# Patient Record
Sex: MALE | Race: WHITE
[De-identification: names, ages, dates, MRNs, and addresses within clinical notes are randomized per-mention and may not be internally consistent; named-entity substitution may affect disease eponyms.]

---
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
## 2019-06-03 PROBLEM — Z00.00 ENCOUNTER FOR PREVENTIVE HEALTH EXAMINATION: Status: ACTIVE | Noted: 2019-06-03

## 2019-06-06 ENCOUNTER — APPOINTMENT (OUTPATIENT)
Dept: ORTHOPEDIC SURGERY | Facility: CLINIC | Age: 19
End: 2019-06-06

## 2019-06-18 ENCOUNTER — APPOINTMENT (OUTPATIENT)
Dept: ORTHOPEDIC SURGERY | Facility: CLINIC | Age: 19
End: 2019-06-18
Payer: COMMERCIAL

## 2019-06-18 VITALS
BODY MASS INDEX: 25.9 KG/M2 | SYSTOLIC BLOOD PRESSURE: 125 MMHG | DIASTOLIC BLOOD PRESSURE: 72 MMHG | HEART RATE: 71 BPM | HEIGHT: 71 IN | WEIGHT: 185 LBS

## 2019-06-18 PROCEDURE — 73610 X-RAY EXAM OF ANKLE: CPT | Mod: RT

## 2019-06-18 PROCEDURE — 99203 OFFICE O/P NEW LOW 30 MIN: CPT

## 2019-06-18 NOTE — REVIEW OF SYSTEMS
[Muscle Weakness] : muscle weakness [Joint Stiffness] : joint stiffness [Joint Pain] : joint pain [[email or web address]] : right anle [Negative] : Heme/Lymph

## 2019-06-18 NOTE — HISTORY OF PRESENT ILLNESS
[Improving] : improving [___ mths] : [unfilled] month(s) ago [Intermit.] : ~He/She~ states the symptoms seem to be intermittent [2] : an average pain level of 2/10 [8] : a maximum pain level of 8/10 [Running] : worsened by running [Rest] : relieved by rest [de-identified] : Gordon is a 19 year old male who presents in office for initial evaluation of right Achilles pain.  The pain began on 4/10/19.  The pain is described as cramping and throbbing.  The patient has not received treatment for this issue.  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  His pain gradually increased over the past two months while playing lacrosse.  He utilized taping methods to control his pain during the season.  Currently he endorses a painful nodule located on his Achilles tendon.  Denies prior history of pain, injury, or trauma to the lower extremity.  Denies numbness tingling.  His  at school performed therapeutic exercises and laser therapy.

## 2019-06-18 NOTE — DISCUSSION/SUMMARY
[de-identified] : Gordon is a 19-year-old male who comes in today complaining of right ankle pain secondary to a Achilles tendinitis. We had a Thorough discussion regarding the nature of his symptoms as well as all treatment options. The likely cause of his Achilles tendinitis is from overuse during his lacrosse season this year. Rest has significantly improved his symptoms. At this time I recommend oral antibiotic course and physical therapy. I also recommended he purchase a pair orthotic inserts. I gave him the name which is superficial green inserts. 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 This will help make his ankle more efficient and increase force through the Achilles in a more efficient manner. I will see him back in 6-8 weeks for clinical reassessment. He will do all activities as tolerated. He reasonably well plan and all questions were answered.

## 2019-06-18 NOTE — PHYSICAL EXAM
[de-identified] : \par The following radiographs were ordered and read by me during this patients visit. I reviewed each radiograph in detail with the patient and discussed the findings as highlighted below. \par \par 3 views of the right ankle were obtained today that show no fracture, dislocation. There is no degenerative change seen. There is no malalignment. No obvious osseous abnormality. Otherwise unremarkable. [de-identified] : Physical Exam:\par General: Well appearing, no acute distress, A&O\par Neurologic: A&Ox3, No focal deficits\par Head: NCAT without abrasions, lacerations, or ecchymosis to head, face, or scalp\par Eyes: No scleral icterus, no gross abnormalities\par Respiratory: Equal chest wall expansion bilaterally, no accessory muscle use\par Lymphatic: No lymphadenopathy palpated\par Skin: Warm and dry\par Psychiatric: Normal mood and affect\par Right ankle exam\par \par Skin: Clean, dry, intact\par Inspection: No obvious malalignment, no swelling, no effusion\par Pulses: 2+ DP/PT pulses\par ROM: RIGHT 15 degrees of dorsiflexion,25 degrees of plantarflexion, 010 degrees of subtalar motion. LEFT 15 degrees of dorsiflexion, 25 degrees of plantarflexion,10] degrees of subtalar motion. \par Tenderness: No tenderness over the lateral malleolus, [no] CFL/ATFL/PTFL pain. No medial malleolus pain, no deltoid ligament pain. No proximal fibular pain. No heel pain.\par Stability: [Negative] anterior/posterior drawer.\par Strength: 5/5 TA/GS/EHL\par Neuro: In tact to light touch throughout\par Additional tests: Negative Mortons test, Negative syndesmosis squeeze test. \par \par Small nodule noted at the midportion of the Achilles. No tenderness of the areas of nodular soft.\par

## 2019-08-05 ENCOUNTER — APPOINTMENT (OUTPATIENT)
Dept: ORTHOPEDIC SURGERY | Facility: CLINIC | Age: 19
End: 2019-08-05

## 2024-06-27 ENCOUNTER — APPOINTMENT (OUTPATIENT)
Dept: ORTHOPEDIC SURGERY | Facility: CLINIC | Age: 24
End: 2024-06-27
Payer: COMMERCIAL

## 2024-06-27 VITALS
DIASTOLIC BLOOD PRESSURE: 79 MMHG | HEIGHT: 71 IN | WEIGHT: 190 LBS | SYSTOLIC BLOOD PRESSURE: 130 MMHG | HEART RATE: 85 BPM | BODY MASS INDEX: 26.6 KG/M2

## 2024-06-27 DIAGNOSIS — S86.012A STRAIN OF LEFT ACHILLES TENDON, INITIAL ENCOUNTER: ICD-10-CM

## 2024-06-27 DIAGNOSIS — M25.572 PAIN IN LEFT ANKLE AND JOINTS OF LEFT FOOT: ICD-10-CM

## 2024-06-27 DIAGNOSIS — M76.61 ACHILLES TENDINITIS, RIGHT LEG: ICD-10-CM

## 2024-06-27 PROCEDURE — 73610 X-RAY EXAM OF ANKLE: CPT | Mod: 26,LT

## 2024-06-27 PROCEDURE — 99203 OFFICE O/P NEW LOW 30 MIN: CPT

## 2024-06-28 ENCOUNTER — APPOINTMENT (OUTPATIENT)
Dept: MRI IMAGING | Facility: CLINIC | Age: 24
End: 2024-06-28
Payer: COMMERCIAL

## 2024-06-28 ENCOUNTER — OUTPATIENT (OUTPATIENT)
Dept: OUTPATIENT SERVICES | Facility: HOSPITAL | Age: 24
LOS: 1 days | End: 2024-06-28
Payer: COMMERCIAL

## 2024-06-28 DIAGNOSIS — M25.572 PAIN IN LEFT ANKLE AND JOINTS OF LEFT FOOT: ICD-10-CM

## 2024-06-28 PROCEDURE — 73721 MRI JNT OF LWR EXTRE W/O DYE: CPT | Mod: 26,LT

## 2024-06-28 PROCEDURE — 73721 MRI JNT OF LWR EXTRE W/O DYE: CPT

## 2024-07-01 ENCOUNTER — NON-APPOINTMENT (OUTPATIENT)
Age: 24
End: 2024-07-01

## 2024-07-01 PROBLEM — S86.012A RUPTURE OF LEFT ACHILLES TENDON, INITIAL ENCOUNTER: Status: ACTIVE | Noted: 2024-07-01

## 2024-07-08 ENCOUNTER — APPOINTMENT (OUTPATIENT)
Dept: ORTHOPEDIC SURGERY | Facility: CLINIC | Age: 24
End: 2024-07-08
Payer: COMMERCIAL

## 2024-07-08 VITALS
SYSTOLIC BLOOD PRESSURE: 113 MMHG | DIASTOLIC BLOOD PRESSURE: 69 MMHG | HEART RATE: 88 BPM | WEIGHT: 191 LBS | HEIGHT: 71 IN | BODY MASS INDEX: 26.74 KG/M2

## 2024-07-08 DIAGNOSIS — S86.012A STRAIN OF LEFT ACHILLES TENDON, INITIAL ENCOUNTER: ICD-10-CM

## 2024-07-08 PROCEDURE — 99214 OFFICE O/P EST MOD 30 MIN: CPT

## 2024-08-19 ENCOUNTER — APPOINTMENT (OUTPATIENT)
Dept: ORTHOPEDIC SURGERY | Facility: CLINIC | Age: 24
End: 2024-08-19
Payer: COMMERCIAL

## 2024-08-19 VITALS
SYSTOLIC BLOOD PRESSURE: 117 MMHG | BODY MASS INDEX: 26.74 KG/M2 | DIASTOLIC BLOOD PRESSURE: 69 MMHG | WEIGHT: 191 LBS | HEART RATE: 64 BPM | HEIGHT: 71 IN

## 2024-08-19 DIAGNOSIS — S86.012D STRAIN OF LEFT ACHILLES TENDON, SUBSEQUENT ENCOUNTER: ICD-10-CM

## 2024-08-19 PROCEDURE — 99213 OFFICE O/P EST LOW 20 MIN: CPT

## 2024-08-19 NOTE — DISCUSSION/SUMMARY
[de-identified] : 25yo male is recovering very well s/p left achilles tendon tear. He has worked out of boot with PT and is able to ambulate comfortably in normal footwear. I recommend he continue PT for another 6-8 weeks to work on strengthening and advancing exercises. He will follow up in 6-8 weeks for re-evaluation.  The patient was given the opportunity to ask questions and all questions were answered to their satisfaction.

## 2024-08-19 NOTE — HISTORY OF PRESENT ILLNESS
[de-identified] : Patient follows up on his left ankle today. On June 26th patient was playing lacrosse tore his left achilles tendon. Due to location of tear it was managed nonoperatively. He wore CAM boot for nearly 6 weeks and has been in PT advancing per the standard achilles protocol. He states he has been out of the boot for a week now and feeling much better. Patient states he still is not able to completely comfortably lift off of his left ankle, but overall feels well.

## 2024-08-19 NOTE — PHYSICAL EXAM
[de-identified] : General Exam: Appearance: well developed and nourished Orientation: Alert and oriented to person, place, time. Mood: mood and affect well-adjusted, pleasant and cooperative, appropriate for clinical and encounter circumstances  Left Ankle: Skin: intact, no rashes or lesions. Inspection: +mild tenderness at heel, normal alignment, no deformity, no warmth, no masses Dorsiflexion: Strength: 5/5, normal muscle tone. Plantarflexion: Strength: 4+/5, normal muscle tone. Inversion/Eversion: Strength: 5/5, normal muscle tone. Pulses: 2+ DP [de-identified] : intact ROM